# Patient Record
Sex: FEMALE | Race: WHITE
[De-identification: names, ages, dates, MRNs, and addresses within clinical notes are randomized per-mention and may not be internally consistent; named-entity substitution may affect disease eponyms.]

---

## 2019-07-26 ENCOUNTER — HOSPITAL ENCOUNTER (EMERGENCY)
Dept: HOSPITAL 36 - ER | Age: 84
Discharge: NURSING, REHAB OR LONG-TERM CARE | End: 2019-07-26
Payer: MEDICARE

## 2019-07-26 DIAGNOSIS — F03.90: ICD-10-CM
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
## 2019-07-26 DIAGNOSIS — I10: ICD-10-CM
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
## 2019-07-26 DIAGNOSIS — Z86.73: ICD-10-CM
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
## 2019-07-26 DIAGNOSIS — S01.112A: Primary | ICD-10-CM
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
## 2019-07-26 PROCEDURE — X6488: HCPCS

## 2019-07-26 PROCEDURE — Z7502: HCPCS

## 2019-07-26 NOTE — ED PHYSICIAN CHART
ED Chief Complaint/HPI





- Patient Information


Date Seen:: 07/26/19


Time Seen:: 00:20


Chief Complaint:: eyebrow laceration


History of Present Illness:: 





Patient reportedly fell out of bed at 2300 tonight.  It is mentioned on the 

transfer document the patient has erythema of the left hip.  Last tetanus 

biooster unknown


Allergies:: 


 Allergies











Allergy/AdvReac Type Severity Reaction Status Date / Time


 


No Known Allergies Allergy   Verified 07/26/19 00:35











Vitals:: 


 Vital Signs - 8 hr











  07/26/19





  00:10


 


Temp 97.1 F


 


HR 96


 


RR 19


 


/66


 


O2 Sat % 94











Historian:: Family Member


Review:: Nurse's Note Reviewed, Transfer documents Reviewed





ED Review of Systems





- Review of Systems


General/Constitutional: No fever, No chills


Skin: Skin lesions


Head: No headache


Eyes: No loss of vision


ENT: No earache


Neck: No neck pain, No swelling


Cardio Vascular: No chest pain, No palpitations


Pulmonary: No SOB


GI: No nausea, No vomiting, No diarrhea


Musculoskeletal: No bone or joint pain


Endocrine: No polyuria


Psychiatric: No prior psych history, No depression


Hematopoietic: No bruising


Allergic/Immuno: No urticaria


Neurological: No syncope, No focal symptoms





ED Past Medical History





- Past Medical History


Past Medical History: HTN, CVA/TIA, Dementia, Other (status post sepsis; 

history of respiratory failure; )


Family History: Heart disease, Diabetes Melitus, HTN


Social History: Non Smoker, No Alcohol


Surgical History: Hernia


Psychiatricy History: Dementia


Medication: Reviewed





ED Physical Exam





- Physical Examination


General/Constitutional: Awake, Well-developed, well-nourished, Alert


Other Head comments:: 


1 cm  laceration lateral aspect left eyebrow


Eyes: Lids, conjuctiva normal, PERRL


Skin: No rash


ENMT: External ears, nose nl


Neck: No nuchal rigidity


Respiratory: Nl effort/Exclusion, Clear to Auscultation


Other Cardio Vascular comments:: 





Heart sounds faint; pulse regular


GI: No tenderness/rebounding/guarding, No organomegaly


: No CVA tenderness


Extremities: Normal digits & nails


Other Extremities comments:: 





About 5 cm of very mild erythema left hip; flexion abduction external rotation 

left hip without resistance or any signs of discomfort.


Neuro/Psych: No focal deficits





ED Assessment





- Procedures


Procedures:: 





Skin cleansed Betadine solution; 1% lidocaine with epinephrine used for local 

anesthesia; laceration irrigated with normal saline; 6-0 chromic one 

interrupted and 5 running sutures to close the laceration


Laceration Type:: Simple


Inspection: No dirt/debris, NO FB





ED Septic Shock





- .


Is Septic Shock (SBP<90, OR Lactate>4 mmol\L) present?: No





- <6hrs of presentation:


Vital Signs: 


 Vital Signs - 8 hr











  07/26/19





  00:10


 


Temp 97.1 F


 


HR 96


 


RR 19


 


/66


 


O2 Sat % 94














ED Reassessment (Disposition)





- Reassessment


Reassessment Condition:: Improved





- Diagnosis


Diagnosis:: 





1 cm eyebrow laceration





- Aftercare/Follow up Instructions


Aftercare/Follow-Up Instructions:: Refer to Discharge Instructions





- Patient Disposition


Discharge/Transfer:: Long Term Care - SNF


Transport Method:: BLS


Condition at Disposition:: Stable, Improved